# Patient Record
Sex: MALE | Race: WHITE | NOT HISPANIC OR LATINO | URBAN - METROPOLITAN AREA
[De-identification: names, ages, dates, MRNs, and addresses within clinical notes are randomized per-mention and may not be internally consistent; named-entity substitution may affect disease eponyms.]

---

## 2022-10-10 ENCOUNTER — OFFICE VISIT (OUTPATIENT)
Dept: PSYCHIATRY | Facility: CLINIC | Age: 52
End: 2022-10-10
Payer: COMMERCIAL

## 2022-10-10 DIAGNOSIS — F29 ATYPICAL PSYCHOSIS (HCC): Primary | ICD-10-CM

## 2022-10-10 PROCEDURE — 99213 OFFICE O/P EST LOW 20 MIN: CPT | Performed by: NURSE PRACTITIONER

## 2022-10-10 RX ORDER — OLANZAPINE 20 MG/1
20 TABLET ORAL
Qty: 90 TABLET | Refills: 0 | Status: SHIPPED | OUTPATIENT
Start: 2022-10-10

## 2022-10-10 NOTE — PSYCH
Subjective:  Patient presents for medication check, reports continued left leg pain, with sciatica  "When I get a raise with my [employer] [place], I am hoping to be able to get my own apartment, still near to my mother, though"  Drives himself, "I walk at the mall for exercise "       Patient ID: Genia Henry is a 47 y o  male  HPI ROS Appetite Changes and Sleep: normal appetite, normal energy level, no weight change and normal number of sleep hours    Review Of Systems:     Mood Normal   Behavior Normal    Thought Content Normal   General Normal    Personality Normal   Other Psych Symptoms Normal   Constitutional Normal   ENT Normal   Cardiovascular Normal    Respiratory Normal    Gastrointestinal Normal   Genitourinary Normal    Musculoskeletal Left leg pain, wearing a brace under his pants  Integumentary Normal    Neurological Normal    Endocrine Normal    Other Symptoms Normal              Laboratory Results: No results found for this or any previous visit  Substance Abuse History:  Social History     Substance and Sexual Activity   Drug Use Not on file       Family Psychiatric History: No family history on file      The following portions of the patient's history were reviewed and updated as appropriate: allergies, current medications, past family history, past medical history, past social history, past surgical history and problem list     Social History     Socioeconomic History   • Marital status:      Spouse name: Not on file   • Number of children: Not on file   • Years of education: Not on file   • Highest education level: Not on file   Occupational History   • Not on file   Tobacco Use   • Smoking status: Not on file   • Smokeless tobacco: Not on file   Substance and Sexual Activity   • Alcohol use: Not on file   • Drug use: Not on file   • Sexual activity: Not on file   Other Topics Concern   • Not on file   Social History Narrative   • Not on file     Social Determinants of Health Financial Resource Strain: Not on file   Food Insecurity: Not on file   Transportation Needs: Not on file   Physical Activity: Not on file   Stress: Not on file   Social Connections: Not on file   Intimate Partner Violence: Not on file   Housing Stability: Not on file     Social History     Social History Narrative   • Not on file       Objective:       Mental status:  Appearance calm and cooperative    Mood euthymic   Affect affect was blunted   Speech a normal rate   Thought Processes normal thought processes   Hallucinations no hallucinations present    Thought Content no delusions   Abnormal Thoughts no suicidal thoughts    Orientation  oriented to person and place and time   Remote Memory short term memory intact and long term memory intact   Attention Span concentration intact   Intellect Appears to be of Average Intelligence   Insight Limited insight   Judgement judgment was intact   Muscle Strength Muscle strength and tone were normal and Normal gait    Language no difficulty naming common objects   Fund of Knowledge displays adequate knowledge of current events   Pain none   Pain Scale 0       Assessment/Plan:       Diagnoses and all orders for this visit:    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

## 2023-01-09 ENCOUNTER — OFFICE VISIT (OUTPATIENT)
Dept: PSYCHIATRY | Facility: CLINIC | Age: 53
End: 2023-01-09

## 2023-01-09 DIAGNOSIS — F29 ATYPICAL PSYCHOSIS (HCC): Primary | ICD-10-CM

## 2023-01-09 RX ORDER — GABAPENTIN 300 MG/1
300 CAPSULE ORAL 2 TIMES DAILY
Qty: 180 CAPSULE | Refills: 0 | Status: SHIPPED | OUTPATIENT
Start: 2023-01-09

## 2023-01-09 RX ORDER — OLANZAPINE 20 MG/1
20 TABLET ORAL
Qty: 90 TABLET | Refills: 0 | Status: SHIPPED | OUTPATIENT
Start: 2023-01-09

## 2023-01-09 RX ORDER — GABAPENTIN 300 MG/1
300 CAPSULE ORAL 2 TIMES DAILY
COMMUNITY
Start: 2022-11-12 | End: 2023-01-09 | Stop reason: SDUPTHER

## 2023-01-09 NOTE — PSYCH
Visit Time    Visit Start Time: 3:00 pm  Visit Stop Time: 3:15 pm  Total Visit Duration: 15 minutes    Subjective:  Patient presents in person for med check  Continues with left leg sciatica, showed me a TENS device to treat  Pain continues, daily, "its a nagging 7"  Taking Tylenol Extra Strength, met with neurology, "they recommended Epidural injections, but it was too painful"  Patient ID: Roma Salgado is a 47 y o  male  HPI ROS Appetite Changes and Sleep: normal appetite, recent weight loss(11lbs), no weight change and normal number of sleep hours, "I walk a lot, to lose weight"  Review Of Systems:     Mood Normal   Behavior Normal    Thought Content Normal   General Normal    Personality Normal   Other Psych Symptoms Normal     Substance Abuse History:  Social History     Substance and Sexual Activity   Drug Use Not on file       Family Psychiatric History: No family history on file          Social History     Socioeconomic History   • Marital status:      Spouse name: Not on file   • Number of children: Not on file   • Years of education: Not on file   • Highest education level: Not on file   Occupational History   • Not on file   Tobacco Use   • Smoking status: Not on file   • Smokeless tobacco: Not on file   Substance and Sexual Activity   • Alcohol use: Not on file   • Drug use: Not on file   • Sexual activity: Not on file   Other Topics Concern   • Not on file   Social History Narrative   • Not on file     Social Determinants of Health     Financial Resource Strain: Not on file   Food Insecurity: Not on file   Transportation Needs: Not on file   Physical Activity: Not on file   Stress: Not on file   Social Connections: Not on file   Intimate Partner Violence: Not on file   Housing Stability: Not on file     Social History     Social History Narrative   • Not on file       Objective:       Mental status:  Appearance calm and cooperative  and good eye contact    Mood mood appropriate Affect affect appropriate    Speech a normal rate and speech soft   Thought Processes normal thought processes   Hallucinations no hallucinations present    Thought Content no delusions   Abnormal Thoughts no suicidal thoughts  and no homicidal thoughts    Orientation  oriented to person and place and time   Remote Memory short term memory intact and long term memory intact   Attention Span concentration intact   Intellect Appears to be of Average Intelligence   Insight Limited insight   Judgement judgment was intact   Muscle Strength Muscle strength and tone were normal and Normal gait    Language no difficulty naming common objects   Fund of Knowledge displays adequate knowledge of current events   Pain moderate to severe   Pain Scale 7       Assessment/Plan:       Diagnoses and all orders for this visit:    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[address] )  -     OLANZapine (ZyPREXA) 20 MG tablet; Take 1 tablet (20 mg total) by mouth daily at bedtime  -     gabapentin (NEURONTIN) 300 mg capsule; Take 1 capsule (300 mg total) by mouth 2 (two) times a day    Other orders  -     Discontinue: gabapentin (NEURONTIN) 300 mg capsule; Take 300 mg by mouth 2 (two) times a day          Treatment Recommendations- Risks Benefits      Immediate Medical/Psychiatric/Psychotherapy Treatments and Any Precautions: Mood Stable-Continue medications as prescribed  Considering back surgery, to address continuing left leg pain  Will follow-up with Pain Doctor, Dr Eliel Ramirez

## 2023-04-03 ENCOUNTER — OFFICE VISIT (OUTPATIENT)
Dept: PSYCHIATRY | Facility: CLINIC | Age: 53
End: 2023-04-03

## 2023-04-03 DIAGNOSIS — F29 ATYPICAL PSYCHOSIS (HCC): Primary | ICD-10-CM

## 2023-04-03 RX ORDER — GABAPENTIN 300 MG/1
300 CAPSULE ORAL 2 TIMES DAILY
Qty: 180 CAPSULE | Refills: 0 | Status: SHIPPED | OUTPATIENT
Start: 2023-04-03

## 2023-04-03 RX ORDER — OLANZAPINE 20 MG/1
20 TABLET ORAL
Qty: 90 TABLET | Refills: 0 | Status: SHIPPED | OUTPATIENT
Start: 2023-04-03

## 2023-04-03 NOTE — PSYCH
"Visit Time    Visit Start Time: 3:00 pm  Visit Stop Time: 3:15 pm  Total Visit Duration: 15 minutes    Subjective:  Patient presents in person for med check  Continues with left leg sciatica, no longer using a brace, and not taking opiates  Patient ID: Noreen Campos is a 47 y o  male  HPI ROS Appetite Changes and Sleep:  Appetite \"is down, with the pain\", sleeping well, weight is stable  Review Of Systems:     Mood Normal   Behavior Normal    Thought Content Normal   General Normal    Personality Normal   Other Psych Symptoms Normal     Substance Abuse History:  Social History     Substance and Sexual Activity   Drug Use Not on file       Family Psychiatric History: No family history on file          Social History     Socioeconomic History   • Marital status:      Spouse name: Not on file   • Number of children: Not on file   • Years of education: Not on file   • Highest education level: Not on file   Occupational History   • Not on file   Tobacco Use   • Smoking status: Not on file   • Smokeless tobacco: Not on file   Substance and Sexual Activity   • Alcohol use: Not on file   • Drug use: Not on file   • Sexual activity: Not on file   Other Topics Concern   • Not on file   Social History Narrative   • Not on file     Social Determinants of Health     Financial Resource Strain: Not on file   Food Insecurity: Not on file   Transportation Needs: Not on file   Physical Activity: Not on file   Stress: Not on file   Social Connections: Not on file   Intimate Partner Violence: Not on file   Housing Stability: Not on file     Social History     Social History Narrative   • Not on file       Objective:       Mental status:  Appearance calm and cooperative  and good eye contact    Mood mood appropriate   Affect affect appropriate    Speech a normal rate and speech soft   Thought Processes normal thought processes   Hallucinations no hallucinations present    Thought Content no delusions   Abnormal " "Thoughts no suicidal thoughts  and no homicidal thoughts    Orientation  oriented to person and place and time   Remote Memory short term memory intact and long term memory intact   Attention Span concentration intact   Intellect Appears to be of Average Intelligence   Insight Limited insight   Judgement judgment was intact   Muscle Strength Muscle strength and tone were normal and Normal gait    Language no difficulty naming common objects   Fund of Knowledge displays adequate knowledge of current events   Pain moderate to severe   Pain Scale 7       Assessment/Plan:       Diagnoses and all orders for this visit:    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

## 2023-06-21 DIAGNOSIS — F29 ATYPICAL PSYCHOSIS (HCC): ICD-10-CM

## 2023-06-21 RX ORDER — GABAPENTIN 300 MG/1
300 CAPSULE ORAL 2 TIMES DAILY
Qty: 60 CAPSULE | Refills: 0 | Status: SHIPPED | OUTPATIENT
Start: 2023-06-21 | End: 2023-06-26 | Stop reason: SDUPTHER

## 2023-06-21 NOTE — TELEPHONE ENCOUNTER
client called again to request refill; writer unaware refill request already submitted  Client is requesting a call once refill is sent to pharmacy, please

## 2023-06-23 RX ORDER — OLANZAPINE 20 MG/1
TABLET ORAL
Qty: 90 TABLET | Refills: 0 | Status: SHIPPED | OUTPATIENT
Start: 2023-06-23 | End: 2023-06-26 | Stop reason: SDUPTHER

## 2023-06-26 ENCOUNTER — OFFICE VISIT (OUTPATIENT)
Dept: PSYCHIATRY | Facility: CLINIC | Age: 53
End: 2023-06-26
Payer: COMMERCIAL

## 2023-06-26 DIAGNOSIS — F29 ATYPICAL PSYCHOSIS (HCC): ICD-10-CM

## 2023-06-26 PROBLEM — G40.209 EPILEPSY WITH PARTIAL COMPLEX SEIZURES (HCC): Status: ACTIVE | Noted: 2023-06-26

## 2023-06-26 PROCEDURE — 99214 OFFICE O/P EST MOD 30 MIN: CPT | Performed by: NURSE PRACTITIONER

## 2023-06-26 RX ORDER — OLANZAPINE 20 MG/1
20 TABLET ORAL
Qty: 90 TABLET | Refills: 0 | Status: SHIPPED | OUTPATIENT
Start: 2023-06-26

## 2023-06-26 RX ORDER — GABAPENTIN 300 MG/1
300 CAPSULE ORAL 2 TIMES DAILY
Qty: 60 CAPSULE | Refills: 3 | Status: SHIPPED | OUTPATIENT
Start: 2023-06-26

## 2023-06-26 NOTE — PSYCH
"Visit Time    Visit Start Time: 11:20 am  Visit Stop Time: 11:35 am  Total Visit Duration: 15 minutes    Subjective:  Patient presents in person for med check  Continues with left leg sciatica, no longer using a brace, and not taking opiates  \"I am still taking Tylenol for the pain\"  \"I have bad news, my Mother was diagnosed with colon cancer\"  Continues to smoke THC, \"I used to have a medical card, but it was too expensive, now I just get if from a friend\"  \"My brother moved home, so he is living with Mom and my step-father, my brother stopped drinking, and I don't drink or smoke cigarettes\"  \"My mom still smokes, and I don't think that she is gonna stop\"  Patient ID: Kasey Molina is a 47 y o  male  HPI ROS Appetite Changes and Sleep:  Appetite \"is good\", sleeping well, weight is stable  Review Of Systems:     Mood Normal   Behavior Normal    Thought Content Normal   General Normal    Personality Normal   Other Psych Symptoms Normal     Substance Abuse History:  Social History     Substance and Sexual Activity   Drug Use Not on file       Family Psychiatric History: No family history on file          Social History     Socioeconomic History   • Marital status:      Spouse name: Not on file   • Number of children: Not on file   • Years of education: Not on file   • Highest education level: Not on file   Occupational History   • Not on file   Tobacco Use   • Smoking status: Not on file   • Smokeless tobacco: Not on file   Substance and Sexual Activity   • Alcohol use: Not on file   • Drug use: Not on file   • Sexual activity: Not on file   Other Topics Concern   • Not on file   Social History Narrative   • Not on file     Social Determinants of Health     Financial Resource Strain: Not on file   Food Insecurity: Not on file   Transportation Needs: Not on file   Physical Activity: Not on file   Stress: Not on file   Social Connections: Not on file   Intimate Partner Violence: Not on " "file   Housing Stability: Not on file     Social History     Social History Narrative   • Not on file       Objective:       Mental status:  Appearance calm and cooperative  and good eye contact    Mood mood appropriate   Affect affect appropriate    Speech a normal rate and speech soft   Thought Processes normal thought processes   Hallucinations no hallucinations present    Thought Content no delusions   Abnormal Thoughts no suicidal thoughts  and no homicidal thoughts    Orientation  oriented to person and place and time   Remote Memory short term memory intact and long term memory intact   Attention Span concentration intact   Intellect Appears to be of Average Intelligence   Insight Limited insight   Judgement judgment was intact   Muscle Strength Muscle strength and tone were normal and Normal gait    Language no difficulty naming common objects   Fund of Knowledge displays adequate knowledge of current events   Pain moderate to severe   Pain Scale 7       Assessment/Plan:       Diagnoses and all orders for this visit:    Atypical psychosis ([facility])  -     gabapentin (NEURONTIN) 300 mg capsule; Take 1 capsule (300 mg total) by mouth 2 (two) times a day  -     OLANZapine (ZyPREXA) 20 MG tablet; Take 1 tablet (20 mg total) by mouth daily at bedtime            Treatment Recommendations- Risks Benefits      Immediate Medical/Psychiatric/Psychotherapy Treatments and Any Precautions: Mood Stable-Continue medications as prescribed  Patient now aware that Gabapentin is BID, and he will take it that way  Back pain is improved, \"still there, though\", \"but I am on Tylenol now, not opiates\"                                "

## 2023-06-29 ENCOUNTER — TELEPHONE (OUTPATIENT)
Dept: PSYCHIATRY | Facility: CLINIC | Age: 53
End: 2023-06-29

## 2023-09-18 ENCOUNTER — OFFICE VISIT (OUTPATIENT)
Dept: PSYCHIATRY | Facility: CLINIC | Age: 53
End: 2023-09-18
Payer: COMMERCIAL

## 2023-09-18 DIAGNOSIS — F29 ATYPICAL PSYCHOSIS (HCC): ICD-10-CM

## 2023-09-18 PROCEDURE — 99214 OFFICE O/P EST MOD 30 MIN: CPT | Performed by: NURSE PRACTITIONER

## 2023-09-18 RX ORDER — GABAPENTIN 300 MG/1
300 CAPSULE ORAL 2 TIMES DAILY
Qty: 60 CAPSULE | Refills: 3 | Status: SHIPPED | OUTPATIENT
Start: 2023-09-18

## 2023-09-18 RX ORDER — OLANZAPINE 20 MG/1
20 TABLET ORAL
Qty: 90 TABLET | Refills: 0 | Status: SHIPPED | OUTPATIENT
Start: 2023-09-18

## 2023-09-18 NOTE — PSYCH
Visit Time    Visit Start Time: 2:40 pm  Visit Stop Time: 2:55 pm  Total Visit Duration: 15 minutes    Subjective:  Patient presents in person for med check. Continues with left leg sciatica, I am still taking Tylenol for the pain". Patient's mother's cancer is in remission, "I am so glad about that". Continues to smoke THC, "my card , it was too expensive". My brother moved home, so he is living with Mom and my step-father. Patient ID: Evelyn Dexter is a 47 y.o. male. HPI ROS Appetite Changes and Sleep:  Appetite "is good", sleeping well, weight is stable. Review Of Systems:     Mood Normal   Behavior Normal    Thought Content Normal   General Normal    Personality Normal   Other Psych Symptoms Normal     Substance Abuse History:  Social History     Substance and Sexual Activity   Drug Use Not on file       Family Psychiatric History: No family history on file.         Social History     Socioeconomic History   • Marital status:      Spouse name: Not on file   • Number of children: Not on file   • Years of education: Not on file   • Highest education level: Not on file   Occupational History   • Not on file   Tobacco Use   • Smoking status: Not on file   • Smokeless tobacco: Not on file   Substance and Sexual Activity   • Alcohol use: Not on file   • Drug use: Not on file   • Sexual activity: Not on file   Other Topics Concern   • Not on file   Social History Narrative   • Not on file     Social Determinants of Health     Financial Resource Strain: Not on file   Food Insecurity: Not on file   Transportation Needs: Not on file   Physical Activity: Not on file   Stress: Not on file   Social Connections: Not on file   Intimate Partner Violence: Not on file   Housing Stability: Not on file     Social History     Social History Narrative   • Not on file       Objective:       Mental status:  Appearance calm and cooperative  and good eye contact    Mood mood appropriate   Affect affect appropriate    Speech a normal rate and speech soft   Thought Processes normal thought processes   Hallucinations no hallucinations present    Thought Content no delusions   Abnormal Thoughts no suicidal thoughts  and no homicidal thoughts    Orientation  oriented to person and place and time   Remote Memory short term memory intact and long term memory intact   Attention Span concentration intact   Intellect Appears to be of Average Intelligence   Insight Limited insight   Judgement judgment was intact   Muscle Strength Muscle strength and tone were normal and Normal gait    Language no difficulty naming common objects   Fund of Knowledge displays adequate knowledge of current events   Pain moderate to severe   Pain Scale 7       Assessment/Plan:       Diagnoses and all orders for this visit:    Atypical psychosis ([address])  -     gabapentin (NEURONTIN) 300 mg capsule; Take 1 capsule (300 mg total) by mouth 2 (two) times a day  -     OLANZapine (ZyPREXA) 20 MG tablet; Take 1 tablet (20 mg total) by mouth daily at bedtime            Treatment Recommendations- Risks Benefits      Immediate Medical/Psychiatric/Psychotherapy Treatments and Any Precautions: Mood Stable-Continue medications as prescribed. Patient now aware that Gabapentin is BID, and he will take it that way. Back pain is improved, "still there, though", "but I am on Tylenol ".

## 2023-12-11 ENCOUNTER — OFFICE VISIT (OUTPATIENT)
Dept: PSYCHIATRY | Facility: CLINIC | Age: 53
End: 2023-12-11
Payer: COMMERCIAL

## 2023-12-11 DIAGNOSIS — F29 ATYPICAL PSYCHOSIS (HCC): Primary | ICD-10-CM

## 2023-12-11 PROCEDURE — 99214 OFFICE O/P EST MOD 30 MIN: CPT | Performed by: NURSE PRACTITIONER

## 2023-12-11 RX ORDER — GABAPENTIN 300 MG/1
300 CAPSULE ORAL 3 TIMES DAILY
Qty: 90 CAPSULE | Refills: 3 | Status: SHIPPED | OUTPATIENT
Start: 2023-12-11

## 2023-12-11 RX ORDER — OLANZAPINE 20 MG/1
20 TABLET ORAL
Qty: 90 TABLET | Refills: 0 | Status: SHIPPED | OUTPATIENT
Start: 2023-12-11

## 2023-12-11 NOTE — PSYCH
Visit Time    Visit Start Time: 2:40 pm  Visit Stop Time: 2:55 pm  Total Visit Duration:  15 minutes    Subjective:  Patient presents in person for med check. Continues with left leg sciatica, I am still taking Tylenol for the pain". Patient's mother's colon  cancer is in remission, "she had surgery, no chemo, though". Continues to smoke THC, " My brother moved home, so he is living with Mom and my step-father. "My brother got a job putting together candy boxes". Patient now living in his own apartment since September, in [place], "it's one of 4 apartments". No longer struggling with hyponatremia.  "I take salt pills, and its ok now". Patient ID: Amarilis Dotson is a 47 y.o. male. HPI ROS Appetite Changes and Sleep:  Appetite "is good", sleeping well, weight is stable. Review Of Systems:     Mood Normal   Behavior Normal    Thought Content Normal   General Normal    Personality Normal   Other Psych Symptoms Normal     Substance Abuse History:  Social History     Substance and Sexual Activity   Drug Use Not on file       Family Psychiatric History: No family history on file.         Social History     Socioeconomic History   • Marital status:      Spouse name: Not on file   • Number of children: Not on file   • Years of education: Not on file   • Highest education level: Not on file   Occupational History   • Not on file   Tobacco Use   • Smoking status: Not on file   • Smokeless tobacco: Not on file   Substance and Sexual Activity   • Alcohol use: Not on file   • Drug use: Not on file   • Sexual activity: Not on file   Other Topics Concern   • Not on file   Social History Narrative   • Not on file     Social Determinants of Health     Financial Resource Strain: Not on file   Food Insecurity: Not on file   Transportation Needs: Not on file   Physical Activity: Not on file   Stress: Not on file   Social Connections: Not on file   Intimate Partner Violence: Not on file   Housing Stability: Not on file     Social History     Social History Narrative   • Not on file       Objective:       Mental status:  Appearance calm and cooperative  and good eye contact    Mood mood appropriate   Affect affect appropriate    Speech a normal rate and speech soft   Thought Processes normal thought processes   Hallucinations no hallucinations present    Thought Content no delusions   Abnormal Thoughts no suicidal thoughts  and no homicidal thoughts    Orientation  oriented to person and place and time   Remote Memory short term memory intact and long term memory intact   Attention Span concentration intact   Intellect Appears to be of Average Intelligence   Insight Limited insight   Judgement judgment was intact   Muscle Strength Muscle strength and tone were normal and Normal gait    Language no difficulty naming common objects   Fund of Knowledge displays adequate knowledge of current events   Pain moderate to severe   Pain Scale 7       Assessment/Plan:       Diagnoses and all orders for this visit:    Atypical psychosis ([facility])  -     gabapentin (NEURONTIN) 300 mg capsule; Take 1 capsule (300 mg total) by mouth 3 (three) times a day  -     OLANZapine (ZyPREXA) 20 MG tablet; Take 1 tablet (20 mg total) by mouth daily at bedtime            Treatment Recommendations- Risks Benefits      Immediate Medical/Psychiatric/Psychotherapy Treatments and Any Precautions: Mood Stable-Continue medications as prescribed. Patient now aware that Gabapentin is TID, and he will take it that way. Back pain is improved, "still there, though", "but I am on Tylenol ".

## 2024-01-17 DIAGNOSIS — F29 ATYPICAL PSYCHOSIS (HCC): ICD-10-CM

## 2024-01-17 RX ORDER — OLANZAPINE 20 MG/1
20 TABLET ORAL
Qty: 90 TABLET | Refills: 0 | Status: SHIPPED | OUTPATIENT
Start: 2024-01-17

## 2024-01-25 ENCOUNTER — TELEPHONE (OUTPATIENT)
Dept: PSYCHIATRY | Facility: CLINIC | Age: 54
End: 2024-01-25

## 2024-03-15 ENCOUNTER — OFFICE VISIT (OUTPATIENT)
Dept: PSYCHIATRY | Facility: CLINIC | Age: 54
End: 2024-03-15
Payer: COMMERCIAL

## 2024-03-15 DIAGNOSIS — F29 ATYPICAL PSYCHOSIS (HCC): Primary | ICD-10-CM

## 2024-03-15 PROCEDURE — 99214 OFFICE O/P EST MOD 30 MIN: CPT | Performed by: NURSE PRACTITIONER

## 2024-03-15 RX ORDER — OLANZAPINE 20 MG/1
20 TABLET ORAL
Qty: 90 TABLET | Refills: 1 | Status: SHIPPED | OUTPATIENT
Start: 2024-03-15

## 2024-03-15 RX ORDER — GABAPENTIN 300 MG/1
300 CAPSULE ORAL 3 TIMES DAILY
Qty: 90 CAPSULE | Refills: 3 | Status: SHIPPED | OUTPATIENT
Start: 2024-03-15
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
## 2024-05-06 ENCOUNTER — TELEPHONE (OUTPATIENT)
Dept: PSYCHIATRY | Facility: CLINIC | Age: 54
End: 2024-05-06

## 2024-05-06 NOTE — TELEPHONE ENCOUNTER
Client called to see if an appointment with Lindsay Oliver could be moved up. Writer informed client that nothing appeared to be open. Writer transferred client to the  assigned to provider and told client to leave a message.

## 2024-06-28 ENCOUNTER — OFFICE VISIT (OUTPATIENT)
Dept: PSYCHIATRY | Facility: CLINIC | Age: 54
End: 2024-06-28
Payer: COMMERCIAL

## 2024-06-28 DIAGNOSIS — F29 ATYPICAL PSYCHOSIS (HCC): Primary | ICD-10-CM

## 2024-06-28 PROCEDURE — 99214 OFFICE O/P EST MOD 30 MIN: CPT | Performed by: NURSE PRACTITIONER

## 2024-06-28 RX ORDER — OLANZAPINE 20 MG/1
20 TABLET ORAL
Qty: 90 TABLET | Refills: 1 | Status: SHIPPED | OUTPATIENT
Start: 2024-06-28

## 2024-06-28 RX ORDER — SODIUM CHLORIDE 1 G/1
1 TABLET ORAL 3 TIMES DAILY
COMMUNITY
Start: 2024-04-09

## 2024-06-28 RX ORDER — OXCARBAZEPINE 600 MG/1
600 TABLET, FILM COATED ORAL 2 TIMES DAILY
COMMUNITY
Start: 2024-04-01

## 2024-06-28 RX ORDER — GABAPENTIN 300 MG/1
300 CAPSULE ORAL 3 TIMES DAILY
Qty: 90 CAPSULE | Refills: 3 | Status: SHIPPED | OUTPATIENT
Start: 2024-06-28

## 2024-06-28 NOTE — PSYCH
"Visit Time    Visit Start Time: 2:00 pm  Visit Stop Time: 2:15 pm  Total Visit Duration:  15 minutes    Subjective:  Patient presents in person for med check.  Continues with left leg sciatica, \"it's much better\".    No seizures \"for years\"    Patient's mother's colon  cancer continues in remission, \"she just had  a colonscopy and she is good to come back in 3 years\".  Continues to smoke THC, \"my brother is still living with Mom, and he is doing good\".     Patient now living in his own apartment since September, in [place], \"I love having my own place\". \"[place] live near the train in [place], Mom is 15 minutes from there\".       Patient ID: Duarte Mojica is a 54 y.o. male.    HPI ROS Appetite Changes and Sleep:  Appetite \"is good\", sleeping well, weight is stable.      Review Of Systems:     Mood Normal   Behavior Normal    Thought Content Normal   General Normal    Personality Normal   Other Psych Symptoms Normal     Substance Abuse History:  Social History     Substance and Sexual Activity   Drug Use Not on file       Family Psychiatric History: No family history on file.        Social History     Socioeconomic History   • Marital status:      Spouse name: Not on file   • Number of children: Not on file   • Years of education: Not on file   • Highest education level: Not on file   Occupational History   • Not on file   Tobacco Use   • Smoking status: Not on file   • Smokeless tobacco: Not on file   Substance and Sexual Activity   • Alcohol use: Not on file   • Drug use: Not on file   • Sexual activity: Not on file   Other Topics Concern   • Not on file   Social History Narrative   • Not on file     Social Determinants of Health     Financial Resource Strain: Not on file   Food Insecurity: Not on file   Transportation Needs: Not on file   Physical Activity: Not on file   Stress: Not on file   Social Connections: Not on file   Intimate Partner Violence: Not on file   Housing Stability: Not on file     Social " "History     Social History Narrative   • Not on file       Objective:       Mental status:  Appearance calm and cooperative  and good eye contact    Mood mood appropriate   Affect affect appropriate    Speech a normal rate and speech soft   Thought Processes normal thought processes   Hallucinations no hallucinations present    Thought Content no delusions   Abnormal Thoughts no suicidal thoughts  and no homicidal thoughts    Orientation  oriented to person and place and time   Remote Memory short term memory intact and long term memory intact   Attention Span concentration intact   Intellect Appears to be of Average Intelligence   Insight Limited insight   Judgement judgment was intact   Muscle Strength Muscle strength and tone were normal and Normal gait    Language no difficulty naming common objects   Fund of Knowledge displays adequate knowledge of current events   Pain \"Still there\"   Pain Scale 6       Assessment/Plan:       Diagnoses and all orders for this visit:    Atypical psychosis (HCC)  -     gabapentin (NEURONTIN) 300 mg capsule; Take 1 capsule (300 mg total) by mouth 3 (three) times a day  -     OLANZapine (ZyPREXA) 20 MG tablet; Take 1 tablet (20 mg total) by mouth daily at bedtime    Other orders  -     sodium chloride 1 g tablet; Take 1 g by mouth 3 (three) times a day  -     OXcarbazepine (TRILEPTAL) 600 mg tablet; Take 600 mg by mouth 2 (two) times a day            Treatment Recommendations- Risks Benefits      Immediate Medical/Psychiatric/Psychotherapy Treatments and Any Precautions: Mood Stable-Continue medications as prescribed.    Patient now aware that Gabapentin is TID, and he will take it that way.       Back pain is improved, continues with Tylenol.                        "

## 2024-10-07 DIAGNOSIS — F29 ATYPICAL PSYCHOSIS (HCC): ICD-10-CM

## 2024-10-11 RX ORDER — OLANZAPINE 20 MG/1
20 TABLET ORAL
Qty: 90 TABLET | Refills: 1 | Status: SHIPPED | OUTPATIENT
Start: 2024-10-11 | End: 2024-10-18 | Stop reason: SDUPTHER

## 2024-10-18 ENCOUNTER — OFFICE VISIT (OUTPATIENT)
Dept: PSYCHIATRY | Facility: CLINIC | Age: 54
End: 2024-10-18
Payer: COMMERCIAL

## 2024-10-18 DIAGNOSIS — F29 ATYPICAL PSYCHOSIS (HCC): ICD-10-CM

## 2024-10-18 PROCEDURE — 99214 OFFICE O/P EST MOD 30 MIN: CPT | Performed by: NURSE PRACTITIONER

## 2024-10-18 RX ORDER — OLANZAPINE 20 MG/1
20 TABLET ORAL
Qty: 90 TABLET | Refills: 1 | Status: SHIPPED | OUTPATIENT
Start: 2024-10-18

## 2024-10-18 RX ORDER — GABAPENTIN 300 MG/1
300 CAPSULE ORAL 3 TIMES DAILY
Qty: 90 CAPSULE | Refills: 3 | Status: SHIPPED | OUTPATIENT
Start: 2024-10-18

## 2024-10-18 NOTE — PSYCH
"Visit Time    Visit Start Time: 1:20 pm  Visit Stop Time: 1:45 pm  Total Visit Duration:  15 minutes    Subjective:  Patient presents in person for med check.  Continues with left leg sciatica, \"it still hurts\"   No seizures \"for years\"    Patient's mother's colon  cancer continues in remission, \"she is still cancer free, and has a mammogram coming up\".   Continues to smoke THC, \"my brother is still living with Mom, and he is taking care of my Mom and step-father\".     Patient now living in his own apartment since September, in [place], \"I love having my own place\". \"I want to start walking more\".  Completed PT, \"I still have pain, if I don't take the Gabapentin and Tylenol\".      Patient ID: Duarte Mojica is a 54 y.o. male.    HPI ROS Appetite Changes and Sleep:  Appetite \"is good\", sleeping well, weight is stable.      Review Of Systems:     Mood Normal   Behavior Normal    Thought Content Normal   General Normal    Personality Normal   Other Psych Symptoms Normal     Substance Abuse History:  Social History     Substance and Sexual Activity   Drug Use Not on file       Family Psychiatric History: No family history on file.        Social History     Socioeconomic History    Marital status:      Spouse name: Not on file    Number of children: Not on file    Years of education: Not on file    Highest education level: Not on file   Occupational History    Not on file   Tobacco Use    Smoking status: Not on file    Smokeless tobacco: Not on file   Substance and Sexual Activity    Alcohol use: Not on file    Drug use: Not on file    Sexual activity: Not on file   Other Topics Concern    Not on file   Social History Narrative    Not on file     Social Determinants of Health     Financial Resource Strain: Not on file   Food Insecurity: Not on file   Transportation Needs: Not on file   Physical Activity: Not on file   Stress: Not on file   Social Connections: Not on file   Intimate Partner Violence: Not on file " "  Housing Stability: Not on file     Social History     Social History Narrative    Not on file       Objective:       Mental status:  Appearance calm and cooperative  and good eye contact    Mood mood appropriate   Affect affect appropriate    Speech a normal rate and speech soft   Thought Processes normal thought processes   Hallucinations no hallucinations present    Thought Content no delusions   Abnormal Thoughts no suicidal thoughts  and no homicidal thoughts    Orientation  oriented to person and place and time   Remote Memory short term memory intact and long term memory intact   Attention Span concentration intact   Intellect Appears to be of Average Intelligence   Insight Limited insight   Judgement judgment was intact   Muscle Strength Muscle strength and tone were normal and Normal gait    Language no difficulty naming common objects   Fund of Knowledge displays adequate knowledge of current events   Pain \"Still there\"   Pain Scale 7 when he wakes up, 4 as the day goes on.       Assessment/Plan:       Diagnoses and all orders for this visit:    Atypical psychosis (HCC)  -     gabapentin (NEURONTIN) 300 mg capsule; Take 1 capsule (300 mg total) by mouth 3 (three) times a day  -     OLANZapine (ZyPREXA) 20 MG tablet; Take 1 tablet (20 mg total) by mouth daily at bedtime            Treatment Recommendations- Risks Benefits      Immediate Medical/Psychiatric/Psychotherapy Treatments and Any Precautions: Mood Stable-Continue medications as prescribed.    Anxiety/Pain-Continue Gabapentin 300 mg TID.    Back pain is improved, continues with Tylenol.  Follow-up with PCP annually.                        "

## 2024-10-21 ENCOUNTER — TELEPHONE (OUTPATIENT)
Age: 54
End: 2024-10-21

## 2024-10-21 NOTE — TELEPHONE ENCOUNTER
Spoke with the pharmacist at [facility]. They do have the prescription, but it was rejecting due to drug-drug interaction - gabapentin and olanzapine. The pharmacist reviewed further. Both medications have been prescribed for over a year. He will prepare the medication.     Called Duarte and jamie FATIMA:  gave a brief, non-detailed message; pharmacy is preparing the medication; call the office for details if desired.

## 2025-01-17 ENCOUNTER — OFFICE VISIT (OUTPATIENT)
Dept: PSYCHIATRY | Facility: CLINIC | Age: 55
End: 2025-01-17
Payer: COMMERCIAL

## 2025-01-17 DIAGNOSIS — F29 ATYPICAL PSYCHOSIS (HCC): Primary | ICD-10-CM

## 2025-01-17 PROCEDURE — 99214 OFFICE O/P EST MOD 30 MIN: CPT | Performed by: NURSE PRACTITIONER

## 2025-01-17 RX ORDER — OLANZAPINE 20 MG/1
20 TABLET ORAL
Qty: 90 TABLET | Refills: 1 | Status: SHIPPED | OUTPATIENT
Start: 2025-01-17

## 2025-01-17 RX ORDER — GABAPENTIN 300 MG/1
300 CAPSULE ORAL 3 TIMES DAILY
Qty: 90 CAPSULE | Refills: 5 | Status: SHIPPED | OUTPATIENT
Start: 2025-01-17

## 2025-01-17 NOTE — PSYCH
"Visit Time    Visit Start Time: 2:30 pm  Visit Stop Time: 2:45 pm  Total Visit Duration:  15 minutes    Subjective:  Patient presents in person for med check.  Continues with left leg sciatica, \"it still hurts\"   No seizures \"for years\".  Now has a right distal radius fracture.  Presents today with a right wrist splint in place.  Has ortho appointment next Tuesday.      Patient's mother's colon  cancer continues in remission, \"She is doing well\".   Continues to smoke THC, \"my brother is still living with Mom, and he is taking care of my Mom and step-father\". Brother drove him to this appointment.      Patient now living in his own apartment since September, in [place], \"I love having my own place\". \"My brother comes over and helps me clean my place, and I helped him with child support\".       Patient ID: Duarte Mojica is a 54 y.o. male.    HPI ROS Appetite Changes and Sleep:  Appetite \"is good\", sleeping well, weight is stable.      Review Of Systems:     Mood Normal   Behavior Normal    Thought Content Normal   General Normal    Personality Normal   Other Psych Symptoms Normal     Substance Abuse History:  Social History     Substance and Sexual Activity   Drug Use Yes   • Frequency: 7.0 times per week   • Types: Marijuana    Comment: No MMJ card, \"I used to have it\"       Family Psychiatric History: No family history on file.        Social History     Socioeconomic History   • Marital status:      Spouse name: Not on file   • Number of children: Not on file   • Years of education: Not on file   • Highest education level: Not on file   Occupational History   • Not on file   Tobacco Use   • Smoking status: Former     Types: Cigarettes   • Smokeless tobacco: Not on file   Substance and Sexual Activity   • Alcohol use: Not on file   • Drug use: Yes     Frequency: 7.0 times per week     Types: Marijuana     Comment: No MMJ card, \"I used to have it\"   • Sexual activity: Not on file   Other Topics Concern   • " Not on file   Social History Narrative   • Not on file     Social Drivers of Health     Financial Resource Strain: Not on file   Food Insecurity: Not on file   Transportation Needs: Not on file   Physical Activity: Not on file   Stress: Not on file   Social Connections: Not on file   Intimate Partner Violence: Not on file   Housing Stability: Not on file     Social History     Social History Narrative   • Not on file       Objective:       Mental status:  Appearance calm and cooperative  and good eye contact    Mood mood appropriate   Affect affect appropriate    Speech a normal rate and speech soft   Thought Processes normal thought processes   Hallucinations no hallucinations present    Thought Content no delusions   Abnormal Thoughts no suicidal thoughts  and no homicidal thoughts    Orientation  oriented to person and place and time   Remote Memory short term memory intact and long term memory intact   Attention Span concentration intact   Intellect Appears to be of Average Intelligence   Insight Limited insight   Judgement judgment was intact   Muscle Strength Muscle strength and tone were normal and Normal gait    Language no difficulty naming common objects   Fund of Knowledge displays adequate knowledge of current events   Pain Legs, and right wrist.   Pain Scale 4 after he takes Tylenol daily.       Assessment/Plan:       Diagnoses and all orders for this visit:    Atypical psychosis (HCC)  -     OLANZapine (ZyPREXA) 20 MG tablet; Take 1 tablet (20 mg total) by mouth daily at bedtime  -     gabapentin (NEURONTIN) 300 mg capsule; Take 1 capsule (300 mg total) by mouth 3 (three) times a day            Treatment Recommendations- Risks Benefits      Immediate Medical/Psychiatric/Psychotherapy Treatments and Any Precautions: Mood Stable-Continue medications as prescribed.    Anxiety/Pain-Continue Gabapentin 300 mg TID.    Back pain is improved, continues with Tylenol.  Follow-up with PCP  annually.

## 2025-01-27 DIAGNOSIS — Z79.899 ENCOUNTER FOR LONG-TERM (CURRENT) USE OF MEDICATIONS: Primary | ICD-10-CM

## 2025-05-30 ENCOUNTER — TELEPHONE (OUTPATIENT)
Age: 55
End: 2025-05-30

## 2025-05-30 DIAGNOSIS — F29 ATYPICAL PSYCHOSIS (HCC): ICD-10-CM

## 2025-05-30 RX ORDER — OLANZAPINE 20 MG/1
20 TABLET, FILM COATED ORAL
Qty: 90 TABLET | Refills: 1 | Status: SHIPPED | OUTPATIENT
Start: 2025-05-30

## 2025-05-30 NOTE — TELEPHONE ENCOUNTER
Patient is calling regarding cancelling an appointment.    Date/Time: 5/30/25 @ 1pm    Reason: will not make it on time    Patient was rescheduled: YES [x] NO []  If yes, when was Patient reschedule for: 8/8/25 @ 1pm    Provider has been advised via secure chat.

## 2025-05-30 NOTE — TELEPHONE ENCOUNTER
Medication Refill Request     Name of Medication     OLANZapine (ZyPREXA) 20 MG tablet     Dose/Frequency     Take 1 tablet (20 mg total) by mouth daily at bedtime     Quantity 90 caps  Verified pharmacy   [x]  Verified ordering Provider   [x]  Does patient have enough for the next 3 days? Yes [x] No []  Does patient have a follow-up appointment scheduled? Yes [x] No []   If so when is appointment: 8/8/25 @ 1pm

## 2025-07-19 DIAGNOSIS — F29 ATYPICAL PSYCHOSIS (HCC): ICD-10-CM

## 2025-07-22 RX ORDER — GABAPENTIN 300 MG/1
300 CAPSULE ORAL 3 TIMES DAILY
Qty: 90 CAPSULE | Refills: 0 | Status: SHIPPED | OUTPATIENT
Start: 2025-07-22

## 2025-08-08 ENCOUNTER — OFFICE VISIT (OUTPATIENT)
Dept: PSYCHIATRY | Facility: CLINIC | Age: 55
End: 2025-08-08
Payer: COMMERCIAL

## 2025-08-08 DIAGNOSIS — Z79.899 ENCOUNTER FOR LONG-TERM (CURRENT) USE OF MEDICATIONS: ICD-10-CM

## 2025-08-08 DIAGNOSIS — F29 ATYPICAL PSYCHOSIS (HCC): Primary | ICD-10-CM

## 2025-08-08 PROCEDURE — 99214 OFFICE O/P EST MOD 30 MIN: CPT | Performed by: NURSE PRACTITIONER

## 2025-08-08 RX ORDER — OLANZAPINE 20 MG/1
20 TABLET, FILM COATED ORAL
Qty: 90 TABLET | Refills: 1 | Status: SHIPPED | OUTPATIENT
Start: 2025-08-08

## 2025-08-08 RX ORDER — GABAPENTIN 300 MG/1
300 CAPSULE ORAL 3 TIMES DAILY
Qty: 90 CAPSULE | Refills: 5 | Status: SHIPPED | OUTPATIENT
Start: 2025-08-08